# Patient Record
(demographics unavailable — no encounter records)

---

## 2018-06-05 NOTE — EMERGENCY ROOM REPORT
History of Present Illness


General


Chief Complaint:  Pain


Source:  Patient





Present Illness


HPI


Patient presents with hemorrhoids.  He's had a diagnosis of this before.  There'

s been no bleeding this time however the past he's had bleeding.  Pain is rated 

7/10, aching burning, worse when sitting down or straining.  No fevers.  He's 

recently taken a colon cleanser.  He has not moved his bowels much recently.  

He does spend long periods of time on the toilet.  Denies rectal intercourse or 

trauma.  





No dysuria or abdominal pain.  No NV.  No URI sy.


Allergies:  


Coded Allergies:  


     No Known Allergies (Unverified , 6/5/18)





Patient History


Past Medical History:  see triage record


Social History:  Denies: smoking, alcohol use


Social History Narrative


 (on computer)


Reviewed Nursing Documentation:  PMH: Agreed; PSxH: Agreed





Nursing Documentation-PMH


Past Medical History:  No Stated History





Review of Systems


Constitutional:  Reports: see HPI


Gastrointestinal:  Reports: see HPI


Genitourinary:  Reports: see HPI


Skin:  Denies: rash


Psychiatric:  Denies: anxiety


Neurological:  Denies: numbness


Hematologic/Lymphatic:  Reports: see HPI





Physical Exam





Vital Signs








  Date Time  Temp Pulse Resp B/P (MAP) Pulse Ox O2 Delivery O2 Flow Rate FiO2


 


6/5/18 10:31 98.9 63 18 128/80 98 Room Air  





 99.0       








Sp02 EP Interpretation:  reviewed, normal


General Appearance:  well appearing, no apparent distress, GCS 15


Head:  normocephalic, atraumatic


Eyes:  bilateral eye normal inspection


ENT:  hearing grossly normal, normal voice, moist mucus membranes


Neck:  full range of motion, supple


Respiratory:  no respiratory distress, speaking full sentences


Gastrointestinal:  normal inspection, normal bowel sounds, non tender, soft


Rectal:  hemorrhoids - 2 external, one with thrombus


Musculoskeletal:  no calf tenderness


Neurologic:  alert, normal gait, grossly normal


Psychiatric:  mood/affect normal


Skin:  no rash





Medical Decision Making


Diagnostic Impression:  


 Primary Impression:  


 Acute hemorrhoid


ER Course


Patient with hemorrhoids.  Due to age and prior hx, doubt cancer.  Based on exam

, no evidence of abscess.  Offered to [facility] them - declined.  Anesthetic jelly 

ordered.





Discussed treatment plan.





Patient stable for outpatient observation and treatment.





Last Vital Signs








  Date Time  Temp Pulse Resp B/P (MAP) Pulse Ox O2 Delivery O2 Flow Rate FiO2


 


6/5/18 11:03 99.0 63 18 128/80 98 Room Air  





 99.0       








Status:  improved


Disposition:  HOME, SELF-CARE


Condition:  Improved


Scripts


Lactulose (LACTULOSE*) 20 Gm/30 Ml Solution


30 ML ORAL BID, #240 ML 0 Refills


   Prov: Solo Samaniego M.D.         6/5/18 


Benzocaine (AMERICAINE) 28 Gm Oint...g.


1 APPLIC TP BID PRN for For Pain, #30 GM


   Prov: Solo Samaniego M.D.         6/5/18 


Hydrocortisone Acetate* (ANUSOL-HC*) 25 Mg Supp.rect


1 SUPP RECTAL TWICE A DAY, #14 SUPP 1 Refill


   Prov: Solo Samaniego M.D.         6/5/18











Solo Samaniego M.D. Jun 5, 2018 10:51

## 2018-06-11 NOTE — EMERGENCY ROOM REPORT
History of Present Illness


General


Chief Complaint:  General Complaint


Source:  Patient





Present Illness


HPI


28-year-old male presents to the emergency department complaining of scant 

bright red blood per rectum on toilet paper 3 days.  Patient reports that 3 

days ago he had about a half tablespoon of blood following recent diagnosis of 

thrombosed hemorrhoid one week ago which he declined to have IND.  Patient 

states that he has been taking lactulose as well as utilizing hydrocortisone 

suppositories and pain medication.  Patient states that over the course of the 

last 3 days the blood has decreased in amount however he still wanted to get 

evaluated as he has never had bleeding from his previous history of 

hemorrhoids.  Patient is also requesting topical hydrocortisone instead of 

suppositories.  He denies pain. Denies constipation reports loose stools after 

taking lactulose.  Denies fevers or chills. Denies CP, Palpitations, LOC, AMS, 

dizziness, Changes in Vision, Sensation, paresthesias, or a sudden severe 

headache.


Allergies:  


Coded Allergies:  


     No Known Allergies (Unverified , 6/5/18)





Patient History


Past Medical History:  see triage record


Past Surgical History:  none


Pertinent Family History:  none


Reviewed Nursing Documentation:  PMH: Agreed; PSxH: Agreed





Nursing Documentation-PMH


Past Medical History:  No History, Except For





Review of Systems


All Other Systems:  negative except mentioned in HPI





Physical Exam





Vital Signs








  Date Time  Temp Pulse Resp B/P (MAP) Pulse Ox O2 Delivery O2 Flow Rate FiO2


 


6/11/18 15:26 98.2 66 18 122/80 96 Room Air  





 98.2       








Sp02 EP Interpretation:  reviewed, normal


General Appearance:  no apparent distress, alert, GCS 15, non-toxic


Head:  normocephalic, atraumatic


ENT:  hearing grossly normal, normal voice


Neck:  full range of motion


Respiratory:  lungs clear, normal breath sounds, speaking full sentences


Cardiovascular #1:  regular rate, rhythm


Gastrointestinal:  normal bowel sounds, non tender, soft, other - Deferred 

rectal exam, normal abdominal exam, no evidence of acute blood loss. . bowel 

sounds are normo active.


Rectal:  deferred


Musculoskeletal:  back normal, gait/station normal, normal range of motion


Neurologic:  alert, oriented x3, responsive, motor strength/tone normal, 

sensory intact, speech normal, grossly normal


Psychiatric:  judgement/insight normal


Skin:  normal color, no rash, warm/dry, well hydrated


Lymphatic:  no adenopathy





Medical Decision Making


PA Attestation


Dr. Weber is my supervising physician whom pt. management has been discussed 

with.


Diagnostic Impression:  


 Primary Impression:  


 External hemorrhoid, bleeding


ER Course


28-year-old male presents to the emergency department complaining of scant 

bright red blood per rectum on toilet paper 3 days.  Patient reports that 3 

days ago he had about a half tablespoon of blood following recent diagnosis of 

thrombosed hemorrhoid one week ago which he declined to have IND.  Patient 

states that he has been taking lactulose as well as utilizing hydrocortisone 

suppositories and pain medication.  Patient states that over the course of the 

last 3 days the blood has decreased in amount however he still wanted to get 

evaluated as he has never had bleeding from his previous history of 

hemorrhoids.  Patient is also requesting topical hydrocortisone instead of 

suppositories.  He denies pain. Denies constipation reports loose stools after 

taking lactulose.  Denies fevers or chills. Denies CP, Palpitations, LOC, AMS, 

dizziness, Changes in Vision, Sensation, paresthesias, or a sudden severe 

headache.





Ddx considered but are not limited to constipation , anal fissure, perianal 

abscess, rectal wall tear, thrombosed hemorrhoid, hemorrhoid. 





Vital signs: are WNL, pt. is afebrile


H&PE are most consistent with   Deferred rectal exam, normal abdominal exam, no 

evidence of acute blood loss. . bowel sounds are normo active. 





ORDERS: None required at this time





ED INTERVENTIONS: - Discussed the patient's self care interventions for 

hemorrhoids





DISCHARGE: At this time pt. is stable for d/c to home. Will provide printed 

patient care instructions, and any necessary prescriptions. Care plan and 

follow up instructions have been discussed with the patient prior to discharge.





Last Vital Signs








  Date Time  Temp Pulse Resp B/P (MAP) Pulse Ox O2 Delivery O2 Flow Rate FiO2


 


6/11/18 15:26 98.2 66 18 122/80 96 Room Air  





 98.2       








Disposition:  HOME, SELF-CARE


Condition:  Stable


Scripts


Docusate Sodium* (COLACE*) 100 Mg Capsule


100 MG ORAL DAILY, #30 CAP


   Prov: Lila Nath         6/11/18 


Acetaminophen* (TYLENOL EXTRA STRENGTH*) 500 Mg Tablet


500 MG ORAL Q6H, #20 TAB 0 Refills


   Prov: Lila Nath         6/11/18 


Hydrocortisone Hc 2.5% Cream (ANUSOL-HC 2.5% CREAM) Y Cr


1 APPLIC RC BID, #30 GM


   Prov: Lila Nath         6/11/18


Patient Instructions:  Hemorrhoids





Additional Instructions:  


Take medications as directed. 





 ** Follow up with a Primary Care Provider in 3-5 days, even if your symptoms 

have resolved. ** 


--Please review list of primary care clinics, if you do not already have a 

primary care provider





Return sooner to ED if new symptoms occur, or current symptoms become worse. 











- Please note that this Emergency Department Report was dictated using [employer] technology software, occasionally this can lead to 

erroneous entry secondary to interpretation by the dictation equipment.











Lila Nath Jun 11, 2018 15:39